# Patient Record
Sex: FEMALE | Race: ASIAN | NOT HISPANIC OR LATINO
[De-identification: names, ages, dates, MRNs, and addresses within clinical notes are randomized per-mention and may not be internally consistent; named-entity substitution may affect disease eponyms.]

---

## 2023-08-02 ENCOUNTER — APPOINTMENT (OUTPATIENT)
Dept: UROLOGY | Facility: CLINIC | Age: 58
End: 2023-08-02
Payer: COMMERCIAL

## 2023-08-02 VITALS
WEIGHT: 106 LBS | RESPIRATION RATE: 16 BRPM | HEIGHT: 64 IN | DIASTOLIC BLOOD PRESSURE: 75 MMHG | SYSTOLIC BLOOD PRESSURE: 114 MMHG | OXYGEN SATURATION: 99 % | BODY MASS INDEX: 18.1 KG/M2 | HEART RATE: 69 BPM

## 2023-08-02 DIAGNOSIS — N30.01 ACUTE CYSTITIS WITH HEMATURIA: ICD-10-CM

## 2023-08-02 PROBLEM — Z00.00 ENCOUNTER FOR PREVENTIVE HEALTH EXAMINATION: Status: ACTIVE | Noted: 2023-08-02

## 2023-08-02 PROCEDURE — 99204 OFFICE O/P NEW MOD 45 MIN: CPT | Mod: 25

## 2023-08-02 PROCEDURE — 51798 US URINE CAPACITY MEASURE: CPT

## 2023-08-02 NOTE — HISTORY OF PRESENT ILLNESS
[FreeTextEntry1] : Name AVI MEDINA  MRN [id number]   Oct  4 1965  Contact Number: [phone] ----------------------------------------------------------------------------------------------------------------------------------------- Date of First Visit: 23 Referring Provider/PCP: unsure - goes just yearly -----------------------------------------------------------------------------------------------------------------------------------------  CC: UTI  History of Present Illness: AVI MEDINA is a 57 year old female who presents for evaluation recent UTI.  About 2 years patient had burning with urination, suprapubic pain before and after urination. Urine culture was negative. Patient tested positive for herpes but no lesions. Saw urology and gave patient started pyridium. Symptoms did not really go away but subsided. No major issues over past 2 years. Then July this year patient had lower abdominal pain, burning with urination, feeling incomplete emptying and fever 101, pink urine. No flank pain. 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 Fever persisted x 4 days, then went to [facility] ED. Urine culture negative but was on macrobid at that time and was given cefuroxime x 10 days. No imaging. Fever went away. But still has residual burning before and after urination, feeling of incomplete emptying.  Patient drank beer last week and made symptoms worse.  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

## 2023-08-02 NOTE — ASSESSMENT
[FreeTextEntry1] : 57 year old with baseline mild urinary symptoms of dysuria, with recent episode of dysuria, hematuria, suprapubic pain, feeling incomplete emptying, and fever to 101. Was on antibiotics prior to culture, and culture negative. Fever and symptoms improved with cefuroxime but did not subside. Reports vaginal dryness at baseline.  We discussed episode likely febrile UTI despite negative culture as patient was on abx at the time. Symptoms could be persistent UTI versus residual inflammation. Given fevers, would like to work up with cystoscopy and CTU. Discussed role of vaginal estrogen with regards to UTIs and her baseline symptoms, will initiate. Also discussed role of water intake, cranberry pill, probiotics.  - UA, urine culture - CTU - cystoscopy - vaginal estrogen - increase water, cranberry, probiotics - f/u for cysto - warning signs for which to seek emergency attention discussed including fevers, chills, flank pain, N/V Anterior

## 2023-08-02 NOTE — PHYSICAL EXAM
[General Appearance - Well Developed] : well developed [General Appearance - Well Nourished] : well nourished [Normal Appearance] : normal appearance [Well Groomed] : well groomed [General Appearance - In No Acute Distress] : no acute distress [] : no respiratory distress [Respiration, Rhythm And Depth] : normal respiratory rhythm and effort [Exaggerated Use Of Accessory Muscles For Inspiration] : no accessory muscle use [Abdomen Soft] : soft [Abdomen Tenderness] : non-tender [Costovertebral Angle Tenderness] : no ~M costovertebral angle tenderness [Urethral Meatus] : the meatus of the urethra showed no abnormalities [FreeTextEntry1] : + tenderness urehtra, pelvic floor, pallor of vagina [Normal Station and Gait] : the gait and station were normal for the patient's age

## 2023-08-03 LAB
APPEARANCE: CLEAR
BACTERIA: NEGATIVE /HPF
BILIRUBIN URINE: NEGATIVE
BLOOD URINE: ABNORMAL
CAST: 0 /LPF
COLOR: YELLOW
EPITHELIAL CELLS: 3 /HPF
GLUCOSE QUALITATIVE U: NEGATIVE MG/DL
KETONES URINE: NEGATIVE MG/DL
LEUKOCYTE ESTERASE URINE: ABNORMAL
MICROSCOPIC-UA: NORMAL
NITRITE URINE: NEGATIVE
PH URINE: 5.5
PROTEIN URINE: NORMAL MG/DL
RED BLOOD CELLS URINE: 1 /HPF
SPECIFIC GRAVITY URINE: 1.02
UROBILINOGEN URINE: 0.2 MG/DL
WHITE BLOOD CELLS URINE: 84 /HPF

## 2023-08-04 LAB — BACTERIA UR CULT: NORMAL

## 2023-08-07 ENCOUNTER — NON-APPOINTMENT (OUTPATIENT)
Age: 58
End: 2023-08-07

## 2023-08-28 LAB — BACTERIA UR CULT: NORMAL

## 2023-08-29 LAB
APPEARANCE: ABNORMAL
BACTERIA: ABNORMAL /HPF
BILIRUBIN URINE: NEGATIVE
BLOOD URINE: ABNORMAL
CAST: 1 /LPF
COLOR: YELLOW
EPITHELIAL CELLS: 1 /HPF
GLUCOSE QUALITATIVE U: NEGATIVE MG/DL
KETONES URINE: NEGATIVE MG/DL
LEUKOCYTE ESTERASE URINE: ABNORMAL
MICROSCOPIC-UA: NORMAL
NITRITE URINE: NEGATIVE
PH URINE: 5.5
PROTEIN URINE: 300 MG/DL
RED BLOOD CELLS URINE: 2 /HPF
REVIEW: NORMAL
SPECIFIC GRAVITY URINE: 1.02
UROBILINOGEN URINE: 0.2 MG/DL
WHITE BLOOD CELLS URINE: 764 /HPF

## 2023-08-30 ENCOUNTER — APPOINTMENT (OUTPATIENT)
Dept: UROLOGY | Facility: CLINIC | Age: 58
End: 2023-08-30
Payer: COMMERCIAL

## 2023-08-30 VITALS
HEART RATE: 92 BPM | DIASTOLIC BLOOD PRESSURE: 63 MMHG | BODY MASS INDEX: 18.1 KG/M2 | SYSTOLIC BLOOD PRESSURE: 96 MMHG | OXYGEN SATURATION: 98 % | HEIGHT: 64 IN | RESPIRATION RATE: 16 BRPM | WEIGHT: 106 LBS

## 2023-08-30 PROCEDURE — 99214 OFFICE O/P EST MOD 30 MIN: CPT

## 2023-08-30 NOTE — ASSESSMENT
[FreeTextEntry1] : 57 year old with baseline mild urinary symptoms of dysuria, with recent episode of dysuria, hematuria, suprapubic pain, feeling incomplete emptying, and fever to 101. Was on antibiotics prior to culture, and culture negative. Fever and symptoms improved with cefuroxime but did not subside. Reports vaginal dryness at baseline.  CTU showed periurethral cyst. Recommend further eval with MRI and referral to eBty - will plan for cystoscopy with Bety to look for ostium given he will perform surgical repair if indicated.  Patient reports baseline dysuria, and is traveling to [place] tomorrow for 2-3 weeks. She would like to leave urine samplein case. She will not be reachable by phone, but if culture ultimately positive will email with results. Wrote for Bactrim but on to take if culture positive, given will not be able to prescribe when she is abroad.  Discussed role of vaginal estrogen with regards to UTIs and her baseline symptoms, will initiate. Also discussed role of water intake, cranberry pill, probiotics.  - UA, urine culture - pelvis MRI to better evaluate urethral cyst - referral to Bety for further management, case discussed with Bety - cystoscopy with Bety - vaginal estrogen - increase water, cranberry, probiotics - warning signs for which to seek emergency attention discussed including fevers, chills, flank pain, N/V. - traveling to [email or web address] tomorrow for 2-3 weeks - will fu upon return and do Bety KO f/u, cysto with Bety  - given patient traveling she would like me to send UA/culture to ensure no brewing infection with baseline symptoms (will email results [email or web address])

## 2023-08-30 NOTE — HISTORY OF PRESENT ILLNESS
[FreeTextEntry1] : Name AVI MEDINA MRN [id number]  Oct 4 1965 Contact Number: [phone] ----------------------------------------------------------------------------------------------------------------------------------------- Date of First Visit: 23 Referring Provider/PCP: unsure - goes just yearly -----------------------------------------------------------------------------------------------------------------------------------------  CC: UTI  History of Present Illness: AVI MEDINA is a 57 year old female who presents for evaluation recent UTI.  About 2 years patient had burning with urination, suprapubic pain before and after urination. Urine culture was negative. Patient tested positive for herpes but no lesions. Saw urology and gave patient started pyridium. Symptoms did not really go away but subsided. No major issues over past 2 years. Then July this year patient had lower abdominal pain, burning with urination, feeling incomplete emptying and fever 101, pink urine. No flank pain. 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 Fever persisted x 4 days, then went to [facility] ED. Urine culture negative but was on macrobid at that time and was given cefuroxime x 10 days. No imaging. Fever went away. But still has residual burning before and after urination, feeling of incomplete emptying.  Patient drank beer last week and made symptoms worse.  Bladder triggers: occasional coffee, English breakfast tea daily, +spicy food often, occasional tomatoes, no pineapple, occasional citrus/acidic food  + vaginal dryness, menopause 7 years ago ---------------------------------------------------------------------------------------------------------------------------------------- Interval History (2023):  Patient underwent CTU 23:  There is a 2.5 cm periurethral cyst with suspicion for adjacent urethral wall thickening. Reviewed imaging with radiology.  UA 23: moderate LE, 764 WBC, 2 RBC, moderate bacteria, urine culture negative  No more fevers or chills. Baseline pain/irritation. Symptoms much improved from acute episode, but not resolved. Patient will be traveling to [facility] tomorrow - wants to do cysto upon her return.  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 BILIARY: Unremarkable. PANCREAS: Unremarkable. SPLEEN: Unremarkable. ADRENAL GLANDS: Unremarkable. KIDNEYS: Unremarkable. URETERS: Unremarkable. URINARY BLADDER: Partially distended, limiting evaluation. There is mild diffuse urinary bladder wall thickening versus under distention. The inferior pelvis there is a 2.5 cm periurethral cyst with questionable urethral wall thickening, series #4, image #145 through 153, series #604, image #58. REPRODUCTIVE ORGANS: Retroverted uterus. STOMACH: Unremarkable. SMALL BOWEL: Unremarkable. LARGE BOWEL: Unremarkable. PERITONEUM: Unremarkable. LYMPH NODES: Unremarkable. VASCULATURE: Unremarkable. SOFT TISSUES: Unremarkable. BONES: There are discovertebral degenerative changes of the spine.  IMPRESSION:   There is a 2.5 cm periurethral cyst with suspicion for adjacent urethral wall thickening. Further evaluation with transvaginal and if still necessary translabial sonography is recommended. Mild diffuse urinary bladder wall thickening versus under distention. There is no evidence for nephrolithiasis or urinary tract obstruction. There are discovertebral degenerative changes of the spine.

## 2023-08-31 LAB
APPEARANCE: ABNORMAL
BACTERIA: ABNORMAL /HPF
BILIRUBIN URINE: NEGATIVE
BLOOD URINE: ABNORMAL
CAST: 3 /LPF
COLOR: YELLOW
EPITHELIAL CELLS: 8 /HPF
GLUCOSE QUALITATIVE U: NEGATIVE MG/DL
KETONES URINE: NEGATIVE MG/DL
LEUKOCYTE ESTERASE URINE: ABNORMAL
MICROSCOPIC-UA: NORMAL
NITRITE URINE: NEGATIVE
PH URINE: 5.5
PROTEIN URINE: 300 MG/DL
RED BLOOD CELLS URINE: 11 /HPF
REVIEW: NORMAL
SPECIFIC GRAVITY URINE: 1.02
UROBILINOGEN URINE: 0.2 MG/DL
WHITE BLOOD CELLS URINE: >998 /HPF

## 2023-09-01 ENCOUNTER — NON-APPOINTMENT (OUTPATIENT)
Age: 58
End: 2023-09-01

## 2023-09-01 LAB — BACTERIA UR CULT: NORMAL

## 2023-10-04 ENCOUNTER — APPOINTMENT (OUTPATIENT)
Dept: UROLOGY | Facility: CLINIC | Age: 58
End: 2023-10-04
Payer: COMMERCIAL

## 2023-10-04 VITALS
SYSTOLIC BLOOD PRESSURE: 96 MMHG | DIASTOLIC BLOOD PRESSURE: 70 MMHG | OXYGEN SATURATION: 99 % | HEART RATE: 82 BPM | TEMPERATURE: 97.9 F

## 2023-10-04 PROCEDURE — 99215 OFFICE O/P EST HI 40 MIN: CPT

## 2023-11-10 ENCOUNTER — APPOINTMENT (OUTPATIENT)
Dept: UROLOGY | Facility: CLINIC | Age: 58
End: 2023-11-10
Payer: COMMERCIAL

## 2023-11-10 VITALS
HEART RATE: 92 BPM | TEMPERATURE: 97.6 F | OXYGEN SATURATION: 100 % | DIASTOLIC BLOOD PRESSURE: 80 MMHG | SYSTOLIC BLOOD PRESSURE: 107 MMHG

## 2023-11-10 DIAGNOSIS — Z78.9 OTHER SPECIFIED HEALTH STATUS: ICD-10-CM

## 2023-11-10 DIAGNOSIS — N39.0 URINARY TRACT INFECTION, SITE NOT SPECIFIED: ICD-10-CM

## 2023-11-10 DIAGNOSIS — Z87.891 PERSONAL HISTORY OF NICOTINE DEPENDENCE: ICD-10-CM

## 2023-11-10 DIAGNOSIS — R31.9 URINARY TRACT INFECTION, SITE NOT SPECIFIED: ICD-10-CM

## 2023-11-10 PROCEDURE — 99214 OFFICE O/P EST MOD 30 MIN: CPT | Mod: 25

## 2023-11-10 PROCEDURE — 52000 CYSTOURETHROSCOPY: CPT

## 2023-11-10 PROCEDURE — 81003 URINALYSIS AUTO W/O SCOPE: CPT | Mod: QW

## 2023-11-17 LAB
BACTERIA UR CULT: NORMAL
BILIRUB UR QL STRIP: NORMAL
GLUCOSE UR-MCNC: NORMAL
HCG UR QL: 0.2 EU/DL
HGB UR QL STRIP.AUTO: NORMAL
KETONES UR-MCNC: NORMAL
LEUKOCYTE ESTERASE UR QL STRIP: NORMAL
NITRITE UR QL STRIP: NORMAL
PH UR STRIP: 5.5
PROT UR STRIP-MCNC: 30
SP GR UR STRIP: 1.03
URINE CYTOLOGY: NORMAL

## 2023-12-12 ENCOUNTER — APPOINTMENT (OUTPATIENT)
Dept: UROLOGY | Facility: CLINIC | Age: 58
End: 2023-12-12
Payer: COMMERCIAL

## 2023-12-12 VITALS — SYSTOLIC BLOOD PRESSURE: 119 MMHG | TEMPERATURE: 97.3 F | HEART RATE: 89 BPM | DIASTOLIC BLOOD PRESSURE: 73 MMHG

## 2023-12-12 PROCEDURE — 99214 OFFICE O/P EST MOD 30 MIN: CPT

## 2023-12-13 LAB
ANION GAP SERPL CALC-SCNC: 12 MMOL/L
APPEARANCE: ABNORMAL
APTT BLD: 29.4 SEC
BACTERIA: ABNORMAL /HPF
BILIRUBIN URINE: NEGATIVE
BLOOD URINE: NEGATIVE
BUN SERPL-MCNC: 23 MG/DL
CALCIUM SERPL-MCNC: 9.2 MG/DL
CAST: 1 /LPF
CHLORIDE SERPL-SCNC: 102 MMOL/L
CO2 SERPL-SCNC: 24 MMOL/L
COLOR: YELLOW
CREAT SERPL-MCNC: 0.92 MG/DL
EGFR: 73 ML/MIN/1.73M2
EPITHELIAL CELLS: 20 /HPF
GLUCOSE QUALITATIVE U: NEGATIVE MG/DL
GLUCOSE SERPL-MCNC: 96 MG/DL
INR PPP: 0.88 RATIO
KETONES URINE: NEGATIVE MG/DL
LEUKOCYTE ESTERASE URINE: ABNORMAL
MICROSCOPIC-UA: NORMAL
NITRITE URINE: NEGATIVE
PH URINE: 5.5
POTASSIUM SERPL-SCNC: 4.5 MMOL/L
PROTEIN URINE: NORMAL MG/DL
PT BLD: 10 SEC
RED BLOOD CELLS URINE: 0 /HPF
SODIUM SERPL-SCNC: 138 MMOL/L
SPECIFIC GRAVITY URINE: 1.03
UROBILINOGEN URINE: 0.2 MG/DL
WHITE BLOOD CELLS URINE: 6 /HPF

## 2023-12-14 ENCOUNTER — APPOINTMENT (OUTPATIENT)
Dept: HEART AND VASCULAR | Facility: CLINIC | Age: 58
End: 2023-12-14
Payer: COMMERCIAL

## 2023-12-14 ENCOUNTER — NON-APPOINTMENT (OUTPATIENT)
Age: 58
End: 2023-12-14

## 2023-12-14 VITALS
SYSTOLIC BLOOD PRESSURE: 130 MMHG | HEIGHT: 64 IN | BODY MASS INDEX: 18.1 KG/M2 | RESPIRATION RATE: 12 BRPM | DIASTOLIC BLOOD PRESSURE: 80 MMHG | HEART RATE: 78 BPM | WEIGHT: 106 LBS

## 2023-12-14 DIAGNOSIS — Z01.810 ENCOUNTER FOR PREPROCEDURAL CARDIOVASCULAR EXAMINATION: ICD-10-CM

## 2023-12-14 DIAGNOSIS — R01.1 CARDIAC MURMUR, UNSPECIFIED: ICD-10-CM

## 2023-12-14 LAB — BACTERIA UR CULT: NORMAL

## 2023-12-14 PROCEDURE — 93306 TTE W/DOPPLER COMPLETE: CPT

## 2023-12-14 PROCEDURE — 93000 ELECTROCARDIOGRAM COMPLETE: CPT | Mod: NC

## 2023-12-14 PROCEDURE — ZZZZZ: CPT

## 2023-12-14 PROCEDURE — 99204 OFFICE O/P NEW MOD 45 MIN: CPT | Mod: 25

## 2023-12-14 RX ORDER — SULFAMETHOXAZOLE AND TRIMETHOPRIM 800; 160 MG/1; MG/1
800-160 TABLET ORAL TWICE DAILY
Qty: 10 | Refills: 0 | Status: DISCONTINUED | COMMUNITY
Start: 2023-08-30 | End: 2023-12-14

## 2023-12-14 RX ORDER — ESTRADIOL 0.1 MG/G
0.1 CREAM VAGINAL
Qty: 1 | Refills: 2 | Status: DISCONTINUED | COMMUNITY
Start: 2023-08-02 | End: 2023-12-14

## 2023-12-14 RX ORDER — CONJUGATED ESTROGENS AND MEDROXYPROGESTERONE ACETATE .625; 2.5 MG/1; MG/1
0.625-2.5 TABLET, SUGAR COATED ORAL
Refills: 0 | Status: ACTIVE | COMMUNITY

## 2023-12-14 NOTE — DISCUSSION/SUMMARY
[Procedure Low Risk] : the procedure risk is low [Patient Low Risk] : the patient is a low surgical risk [Optimized for Surgery] : the patient is optimized for surgery [As per surgery] : as per surgery [FreeTextEntry1] : 1.  Preop clearance for elective cystoscopy and ureteroscopy: Patient is low risk for low risk procedure does greater than 4 METS of daily typically without chest pain shortness of breath PND orthopnea.  Reviewed preop blood work all within normal limits.  May proceed as planned. 2.  Cardiac murmur: Echocardiogram

## 2023-12-14 NOTE — PHYSICAL EXAM
[General Appearance - Well Developed] : well developed [Normal Appearance] : normal appearance [Well Groomed] : well groomed [General Appearance - Well Nourished] : well nourished [No Deformities] : no deformities [General Appearance - In No Acute Distress] : no acute distress [Normal Oral Mucosa] : normal oral mucosa [No Oral Pallor] : no oral pallor [No Oral Cyanosis] : no oral cyanosis [Normal Jugular Venous A Waves Present] : normal jugular venous A waves present [Normal Jugular Venous V Waves Present] : normal jugular venous V waves present [No Jugular Venous Rogers A Waves] : no jugular venous rogers A waves [Respiration, Rhythm And Depth] : normal respiratory rhythm and effort [Exaggerated Use Of Accessory Muscles For Inspiration] : no accessory muscle use [Auscultation Breath Sounds / Voice Sounds] : lungs were clear to auscultation bilaterally [5th Left ICS - MCL] : palpated at the 5th LICS in the midclavicular line [Normal] : normal [Normal Rate] : normal [Rhythm Regular] : regular [Normal S1] : normal S1 [Normal S2] : normal S2 [III] : a grade 3 [Right Carotid Bruit] : no bruit heard over the right carotid [Left Carotid Bruit] : no bruit heard over the left carotid [No Pitting Edema] : no pitting edema present [Bowel Sounds] : normal bowel sounds [Abdomen Soft] : soft [Abdomen Tenderness] : non-tender [Abdomen Mass (___ Cm)] : no abdominal mass palpated [Abnormal Walk] : normal gait [Gait - Sufficient For Exercise Testing] : the gait was sufficient for exercise testing [Nail Clubbing] : no clubbing of the fingernails [Cyanosis, Localized] : no localized cyanosis [Petechial Hemorrhages (___cm)] : no petechial hemorrhages [] : no ischemic changes [Oriented To Time, Place, And Person] : oriented to person, place, and time [Affect] : the affect was normal [Mood] : the mood was normal [No Anxiety] : not feeling anxious

## 2023-12-14 NOTE — HISTORY OF PRESENT ILLNESS
[Preoperative Visit] : for a medical evaluation prior to surgery [Scheduled Procedure ___] : a [unfilled] [Surgeon Name ___] : surgeon: [unfilled] [Good] : Good [Chest Pain] : no chest pain [Dyspnea] : no dyspnea [Lower Extremity Swelling] : no lower extremity swelling [FreeTextEntry1] : 58-year-old female with no significant past medical history comes in for preop clearance for elective cystoscopy/ureteroscopy for urethral cyst.  Overall does 4 METs of daily activity without chest pain shortness of breath PND orthopnea.

## 2023-12-15 RX ORDER — ACETAMINOPHEN 500 MG
1000 TABLET ORAL ONCE
Refills: 0 | Status: COMPLETED | OUTPATIENT
Start: 2023-12-18 | End: 2023-12-18

## 2023-12-15 NOTE — ASU PATIENT PROFILE, ADULT - NS PREOP UNDERSTANDS INFO
No solid food/dairy/candy/gum after 10:45pm Sunday; water allowed before 05:45am Monday; patient reminded to come with photo ID/insurance/credit card; dress comfortable; no jewelries/contact/lens/valuables; no smoking/drinking alcohol/recreational drug use Sunday; escort must have a photo ID; address and callback number given./yes

## 2023-12-15 NOTE — ASU PATIENT PROFILE, ADULT - FALL HARM RISK - UNIVERSAL INTERVENTIONS
Bed in lowest position, wheels locked, appropriate side rails in place/Call bell, personal items and telephone in reach/Instruct patient to call for assistance before getting out of bed or chair/Non-slip footwear when patient is out of bed/[place] to call system/Physically safe environment - no spills, clutter or unnecessary equipment/Purposeful Proactive Rounding/Room/bathroom lighting operational, light cord in reach Bed in lowest position, wheels locked, appropriate side rails in place/Call bell, personal items and telephone in reach/Instruct patient to call for assistance before getting out of bed or chair/Non-slip footwear when patient is out of bed/[place] to call system/Physically safe environment - no spills, clutter or unnecessary equipment/Purposeful Proactive Rounding/Room/bathroom lighting operational, light cord in reach

## 2023-12-17 ENCOUNTER — TRANSCRIPTION ENCOUNTER (OUTPATIENT)
Age: 58
End: 2023-12-17

## 2023-12-18 ENCOUNTER — OUTPATIENT (OUTPATIENT)
Dept: OUTPATIENT SERVICES | Facility: HOSPITAL | Age: 58
LOS: 1 days | Discharge: HOME | End: 2023-12-18
Payer: COMMERCIAL

## 2023-12-18 ENCOUNTER — TRANSCRIPTION ENCOUNTER (OUTPATIENT)
Age: 58
End: 2023-12-18

## 2023-12-18 ENCOUNTER — RESULT REVIEW (OUTPATIENT)
Age: 58
End: 2023-12-18

## 2023-12-18 ENCOUNTER — APPOINTMENT (OUTPATIENT)
Dept: UROLOGY | Facility: HOSPITAL | Age: 58
End: 2023-12-18

## 2023-12-18 VITALS
OXYGEN SATURATION: 100 % | WEIGHT: 105.82 LBS | RESPIRATION RATE: 16 BRPM | HEART RATE: 73 BPM | SYSTOLIC BLOOD PRESSURE: 124 MMHG | DIASTOLIC BLOOD PRESSURE: 85 MMHG | TEMPERATURE: 98 F | HEIGHT: 64 IN

## 2023-12-18 VITALS
RESPIRATION RATE: 16 BRPM | TEMPERATURE: 98 F | OXYGEN SATURATION: 98 % | SYSTOLIC BLOOD PRESSURE: 106 MMHG | DIASTOLIC BLOOD PRESSURE: 72 MMHG | HEART RATE: 66 BPM

## 2023-12-18 PROCEDURE — 52234 CYSTOSCOPY AND TREATMENT: CPT

## 2023-12-18 PROCEDURE — 88305 TISSUE EXAM BY PATHOLOGIST: CPT | Mod: 26

## 2023-12-18 RX ORDER — FENTANYL CITRATE 50 UG/ML
25 INJECTION INTRAVENOUS
Refills: 0 | Status: DISCONTINUED | OUTPATIENT
Start: 2023-12-18 | End: 2023-12-18

## 2023-12-18 RX ORDER — PHENAZOPYRIDINE HCL 100 MG
200 TABLET ORAL ONCE
Refills: 0 | Status: DISCONTINUED | OUTPATIENT
Start: 2023-12-18 | End: 2023-12-18

## 2023-12-18 RX ORDER — PHENAZOPYRIDINE HCL 100 MG
1 TABLET ORAL
Qty: 9 | Refills: 0
Start: 2023-12-18 | End: 2023-12-20

## 2023-12-18 RX ORDER — SODIUM CHLORIDE 9 MG/ML
1000 INJECTION, SOLUTION INTRAVENOUS
Refills: 0 | Status: DISCONTINUED | OUTPATIENT
Start: 2023-12-18 | End: 2023-12-18

## 2023-12-18 RX ADMIN — Medication 1000 MILLIGRAM(S): at 07:15

## 2023-12-18 RX ADMIN — SODIUM CHLORIDE 100 MILLILITER(S): 9 INJECTION, SOLUTION INTRAVENOUS at 09:59

## 2023-12-18 NOTE — ASU DISCHARGE PLAN (ADULT/PEDIATRIC) - PROVIDER TOKENS
PROVIDER:[TOKEN:[[id number]],FOLLOWUP:[2 weeks]] PROVIDER:[TOKEN:[[id number]],FOLLOWUP:[2 weeks]]

## 2023-12-18 NOTE — ASU DISCHARGE PLAN (ADULT/PEDIATRIC) - NS MD DC FALL RISK RISK
For information on Fall & Injury Prevention, visit: [email or web address] OR  [email or web address] OR  https://www.cdc.gov/steadi/patient.html For information on Fall & Injury Prevention, visit: [email or web address]://[email or web address] OR  [email or web address]-[email or web address]-injury OR  https://www.cdc.gov/steadi/patient.html

## 2023-12-18 NOTE — ASU DISCHARGE PLAN (ADULT/PEDIATRIC) - ASU DC SPECIAL INSTRUCTIONSFT
TRANSURETHRAL RESECTION OF BLADDER TUMOR    GENERAL: It is common to have blood in your urine after your procedure.  It may be pink or even red; and it is important to increase fluid intake to 2-3L of water per day to keep the urine as clear as possible. Please inform your doctor if you have a significant amount of clot in the urine or if you are unable to void at all.  The urine may clear and then become bloody again especially as you are more physically active. It is not uncommon to have some burning when you urinate, this will gradually improve. If a catheter is in place, it is not uncommon to have occasional leakage or urine or blood around the catheter. Please call your urologist if this is excessive and/or the urine is not draining through the catheter into the bag.    CATHETER: Some patients are sent home with a Ventura catheter, while others go home urinating on their own. A Ventura catheter continuously drains the urine from the bladder. If you still have a catheter, the nurses will review instructions and care before you go home. For men, you may have a prescription for lidocaine jelly to apply to the tip of your penis, as needed, for catheter related discomfort.     UROLOGIC MEDICATIONS:  The following medications may have been sent to your pharmacy for stent related discomfort: Pyridium (phenazopyridine) 200mg every 8 hours as needed for kidney/bladder discomfort for max 3 days (Pyridium will make your urine orange). For your convenience, all prescriptions are sent to to your preferred pharmacy.     PAIN: You may take Tylenol (acetaminophen) 650-975mg and/or Motrin (ibuprofen) 400-600mg, both available over the counter, for pain every 6 hours as needed. Do not exceed 4000mg of Tylenol (acetaminophen) daily. You may alternate these medications such that you take one or the other every 3 hours for around the clock pain coverage.    STOOL SOFTENERS: Do not allow yourself to become constipated as straining may cause bleeding. Take stool softeners or a laxative (ex. Miralax, Colace, Senokot, ExLax, etc), available over the counter, if needed.    ANTICOAGULATION: If you are taking any blood thinning medications, please discuss with your urologist prior to restarting these medications unless otherwise specified.    BATHING: You may shower or bathe. If going home with ventura, shower only until catheter is removed.    DIET: You may resume your regular diet and regular medication regimen.    ACTIVITY: No heavy lifting or strenuous exercise until you are evaluated at your post-operative appointment. Otherwise, you may return to your usual level of physical activity.    FOLLOW-UP: If you did not already schedule your post-operative appointment, please call your urologist to schedule and follow-up appointment.    CALL YOUR UROLOGIST IF: You have any bleeding that does not stop, inability to void >8 hours, fever over 100.4 F, chills, persistent nausea/vomiting, changes in your incision concerning for infection, or if your pain is not controlled on your discharge pain medications.

## 2023-12-18 NOTE — PRE-ANESTHESIA EVALUATION ADULT - NSANTHOSAYNRD_GEN_A_CORE
No. ROGERS screening performed.  STOP BANG Legend: 0-2 = LOW Risk; 3-4 = INTERMEDIATE Risk; 5-8 = HIGH Risk

## 2023-12-18 NOTE — ASU DISCHARGE PLAN (ADULT/PEDIATRIC) - CARE PROVIDER_API CALL
Silvia Zavala  Urology  [address] [place] [id number]  Phone: [phone]  Fax: [phone]  Follow Up Time: 2 weeks   Silvia Zavala  Urology  [address] [place] [id number]  Phone: [phone]  Fax: [phone]  Follow Up Time: 2 weeks

## 2023-12-20 LAB
SURGICAL PATHOLOGY STUDY: SIGNIFICANT CHANGE UP
SURGICAL PATHOLOGY STUDY: SIGNIFICANT CHANGE UP

## 2023-12-21 ENCOUNTER — NON-APPOINTMENT (OUTPATIENT)
Age: 58
End: 2023-12-21

## 2024-01-02 ENCOUNTER — APPOINTMENT (OUTPATIENT)
Dept: UROLOGY | Facility: CLINIC | Age: 59
End: 2024-01-02

## 2024-01-03 ENCOUNTER — APPOINTMENT (OUTPATIENT)
Dept: UROLOGY | Facility: CLINIC | Age: 59
End: 2024-01-03
Payer: COMMERCIAL

## 2024-01-03 VITALS — TEMPERATURE: 97.8 F | HEART RATE: 80 BPM | SYSTOLIC BLOOD PRESSURE: 111 MMHG | DIASTOLIC BLOOD PRESSURE: 75 MMHG

## 2024-01-03 DIAGNOSIS — N32.9 BLADDER DISORDER, UNSPECIFIED: ICD-10-CM

## 2024-01-03 DIAGNOSIS — R39.15 URGENCY OF URINATION: ICD-10-CM

## 2024-01-03 PROCEDURE — 99214 OFFICE O/P EST MOD 30 MIN: CPT

## 2024-01-04 ENCOUNTER — RX RENEWAL (OUTPATIENT)
Age: 59
End: 2024-01-04

## 2024-01-04 RX ORDER — OXYBUTYNIN CHLORIDE 10 MG/1
10 TABLET, EXTENDED RELEASE ORAL DAILY
Qty: 90 | Refills: 0 | Status: ACTIVE | COMMUNITY
Start: 2024-01-03 | End: 1900-01-01

## 2024-01-08 LAB — BACTERIA UR CULT: NORMAL

## 2024-01-08 NOTE — HISTORY OF PRESENT ILLNESS
[FreeTextEntry1] : 58-year-old woman with a history of dysuria, suprapubic pain before urination. She has had negative urine cultures in the past. She does have a history of herpes however no current lesions. She did have recent recurrence of symptoms and CT imaging which revealed a periurethral cyst. There was measured at 2.5 cm, along with adjacent urethral wall thickening. She is here today as a second opinion with regards to what to do with this periurethral cyst at its symptoms associated with her voiding. She denies any other irritative or obstructive voiding symptoms. She denies any prolapse sensation. She denies any bowel issues. She does have vaginal dryness secondary to being postmenopausal which she has been treated with vaginal estrogen cream. Postvoid residual in the office today 0 ml.  11/10/23  58-year-old woman with periurethral cyst noted on CT scan, irritative symptoms including dysuria and suprapubic pain. Differential includes a periurethral cyst versus a urethral diverticulum. I recommended a cystoscopy and an MRI of the pelvis in order to further evaluate this finding on CT scan.  Cysto - bladder neck lesion noted, no sotia noted  MRI imaging reviewed  Discussed case with Dr. Zavala  Recommend biopsy, will arrange with Dr. Zavala.  1/3/24  Reviewed path - benign  Reviewed imaging.   Reviewed surgical option.   Plan: Given not bothering her at this point, hold off on surgery for now.  Trial of anticholinergic for urgency, se reviewed.  Urine c/s.

## 2024-03-18 PROBLEM — Z78.9 OTHER SPECIFIED HEALTH STATUS: Chronic | Status: ACTIVE | Noted: 2023-12-15

## 2024-04-02 ENCOUNTER — APPOINTMENT (OUTPATIENT)
Dept: UROLOGY | Facility: CLINIC | Age: 59
End: 2024-04-02
Payer: COMMERCIAL

## 2024-04-02 VITALS
DIASTOLIC BLOOD PRESSURE: 87 MMHG | SYSTOLIC BLOOD PRESSURE: 123 MMHG | OXYGEN SATURATION: 100 % | TEMPERATURE: 96.26 F | HEART RATE: 89 BPM

## 2024-04-02 PROCEDURE — 99215 OFFICE O/P EST HI 40 MIN: CPT

## 2024-04-04 NOTE — HISTORY OF PRESENT ILLNESS
[FreeTextEntry1] : 58-year-old woman with a history of dysuria, suprapubic pain before urination. She has had negative urine cultures in the past. She does have a history of herpes however no current lesions. She did have recent recurrence of symptoms and CT imaging which revealed a periurethral cyst. There was measured at 2.5 cm, along with adjacent urethral wall thickening. She is here today as a second opinion with regards to what to do with this periurethral cyst at its symptoms associated with her voiding. She denies any other irritative or obstructive voiding symptoms. She denies any prolapse sensation. She denies any bowel issues. She does have vaginal dryness secondary to being postmenopausal which she has been treated with vaginal estrogen cream. Postvoid residual in the office today 0 ml.  11/10/23  58-year-old woman with periurethral cyst noted on CT scan, irritative symptoms including dysuria and suprapubic pain. Differential includes a periurethral cyst versus a urethral diverticulum. I recommended a cystoscopy and an MRI of the pelvis in order to further evaluate this finding on CT scan.  Cysto - bladder neck lesion noted, no sotia noted  MRI imaging reviewed  Discussed case with Dr. Zavala  Recommend biopsy, will arrange with Dr. Zavala.  1/3/24  Reviewed path - benign  Reviewed imaging.  Reviewed surgical option.  Plan: Given not bothering her at this point, hold off on surgery for now. Trial of anticholinergic for urgency, se reviewed. Urine c/s.  4/2/24  58-year-old woman with known urethral diverticulum currently being managed conservatively.  She is now complaining that she is having more frequency and urgency symptoms as well as dribbling.  She also is having pain with intercourse.  At this point she is becoming significantly more bothered with her urethral diverticulum and is desirous of definitive treatment.    On exam, the periurethral's is still quite palpable, unable to express any fluid with palpation, mildly sore to exam.  Plan: RB APCs of urethral diverticulectomy discussed with the patient at length.  All of her questions were answered.  Discussed that she possibility of catheter to be indwelling for a week to 10 days if there is urethral reconstruction that is necessary in order to aid with healing.  Patient would like to move forward with surgery.

## 2024-04-10 LAB — BACTERIA UR CULT: NORMAL

## 2024-04-19 NOTE — ASU PATIENT PROFILE, ADULT - FALL HARM RISK - UNIVERSAL INTERVENTIONS
Bed in lowest position, wheels locked, appropriate side rails in place/Call bell, personal items and telephone in reach/Instruct patient to call for assistance before getting out of bed or chair/Non-slip footwear when patient is out of bed/[place] to call system/Physically safe environment - no spills, clutter or unnecessary equipment/Purposeful Proactive Rounding/Room/bathroom lighting operational, light cord in reach

## 2024-04-19 NOTE — ASU PATIENT PROFILE, ADULT - NS PREOP UNDERSTANDS INFO
Spoke to patient  to be NPO/NO solid foods after  2200 pm on Sunday night,  allow to drink water  till  12Mn, dress comfortable,, leave all valuable  at home , Bring ID photo and insurance  cards,  address and telephone given to  patient/yes

## 2024-04-19 NOTE — ASU PATIENT PROFILE, ADULT - PATIENT'S PREFERRED PRONOUN
Her/She [Dyspnea on exertion] : not dyspnea during exertion [Palpitations] : palpitations [Dizziness] : dizziness [Negative] : Heme/Lymph

## 2024-04-19 NOTE — ASU PATIENT PROFILE, ADULT - NSICDXPASTSURGICALHX_GEN_ALL_CORE_FT
PAST SURGICAL HISTORY:  No significant past surgical history      PAST SURGICAL HISTORY:  History of biopsy of bladder

## 2024-04-21 ENCOUNTER — TRANSCRIPTION ENCOUNTER (OUTPATIENT)
Age: 59
End: 2024-04-21

## 2024-04-22 ENCOUNTER — OUTPATIENT (OUTPATIENT)
Dept: OUTPATIENT SERVICES | Facility: HOSPITAL | Age: 59
LOS: 1 days | Discharge: HOME | End: 2024-04-22
Payer: COMMERCIAL

## 2024-04-22 ENCOUNTER — RESULT REVIEW (OUTPATIENT)
Age: 59
End: 2024-04-22

## 2024-04-22 ENCOUNTER — APPOINTMENT (OUTPATIENT)
Dept: UROLOGY | Facility: AMBULATORY SURGERY CENTER | Age: 59
End: 2024-04-22

## 2024-04-22 ENCOUNTER — TRANSCRIPTION ENCOUNTER (OUTPATIENT)
Age: 59
End: 2024-04-22

## 2024-04-22 VITALS
TEMPERATURE: 97 F | OXYGEN SATURATION: 100 % | RESPIRATION RATE: 16 BRPM | DIASTOLIC BLOOD PRESSURE: 64 MMHG | SYSTOLIC BLOOD PRESSURE: 108 MMHG | HEART RATE: 60 BPM

## 2024-04-22 VITALS
HEIGHT: 64.96 IN | SYSTOLIC BLOOD PRESSURE: 124 MMHG | DIASTOLIC BLOOD PRESSURE: 90 MMHG | TEMPERATURE: 97 F | RESPIRATION RATE: 16 BRPM | WEIGHT: 105.16 LBS | OXYGEN SATURATION: 99 % | HEART RATE: 66 BPM

## 2024-04-22 DIAGNOSIS — Z98.890 OTHER SPECIFIED POSTPROCEDURAL STATES: Chronic | ICD-10-CM

## 2024-04-22 PROCEDURE — 88305 TISSUE EXAM BY PATHOLOGIST: CPT | Mod: 26

## 2024-04-22 PROCEDURE — 53430 RECONSTRUCTION OF URETHRA: CPT

## 2024-04-22 PROCEDURE — 53230 REMOVAL OF URETHRA LESION: CPT

## 2024-04-22 RX ORDER — CEPHALEXIN 500 MG
1 CAPSULE ORAL
Qty: 9 | Refills: 0
Start: 2024-04-22 | End: 2024-04-24

## 2024-04-22 RX ORDER — SODIUM CHLORIDE 9 MG/ML
1000 INJECTION, SOLUTION INTRAVENOUS
Refills: 0 | Status: DISCONTINUED | OUTPATIENT
Start: 2024-04-22 | End: 2024-04-22

## 2024-04-22 RX ORDER — ACETAMINOPHEN 500 MG
1000 TABLET ORAL ONCE
Refills: 0 | Status: COMPLETED | OUTPATIENT
Start: 2024-04-22 | End: 2024-04-22

## 2024-04-22 RX ORDER — OXYCODONE HYDROCHLORIDE 5 MG/1
5 TABLET ORAL ONCE
Refills: 0 | Status: DISCONTINUED | OUTPATIENT
Start: 2024-04-22 | End: 2024-04-22

## 2024-04-22 RX ORDER — ESTROGEN,CON/M-PROGEST ACET 0.625 (14)
1 TABLET ORAL
Refills: 0 | DISCHARGE

## 2024-04-22 RX ORDER — ACETAMINOPHEN WITH CODEINE 300MG-30MG
1 TABLET ORAL
Qty: 5 | Refills: 0
Start: 2024-04-22

## 2024-04-22 RX ORDER — BENZOCAINE AND MENTHOL 5; 1 G/100ML; G/100ML
1 LIQUID ORAL ONCE
Refills: 0 | Status: DISCONTINUED | OUTPATIENT
Start: 2024-04-22 | End: 2024-04-22

## 2024-04-22 RX ORDER — FENTANYL CITRATE 50 UG/ML
25 INJECTION INTRAVENOUS
Refills: 0 | Status: DISCONTINUED | OUTPATIENT
Start: 2024-04-22 | End: 2024-04-22

## 2024-04-22 RX ORDER — OXYBUTYNIN CHLORIDE 5 MG
2 TABLET ORAL
Qty: 20 | Refills: 0
Start: 2024-04-22

## 2024-04-22 RX ORDER — ONDANSETRON 8 MG/1
4 TABLET, FILM COATED ORAL ONCE
Refills: 0 | Status: DISCONTINUED | OUTPATIENT
Start: 2024-04-22 | End: 2024-04-22

## 2024-04-22 RX ORDER — HYDROMORPHONE HYDROCHLORIDE 2 MG/ML
0.5 INJECTION INTRAMUSCULAR; INTRAVENOUS; SUBCUTANEOUS
Refills: 0 | Status: DISCONTINUED | OUTPATIENT
Start: 2024-04-22 | End: 2024-04-22

## 2024-04-22 RX ORDER — APREPITANT 80 MG/1
40 CAPSULE ORAL ONCE
Refills: 0 | Status: COMPLETED | OUTPATIENT
Start: 2024-04-22 | End: 2024-04-22

## 2024-04-22 RX ADMIN — Medication 1000 MILLIGRAM(S): at 07:40

## 2024-04-22 RX ADMIN — SODIUM CHLORIDE 100 MILLILITER(S): 9 INJECTION, SOLUTION INTRAVENOUS at 11:55

## 2024-04-22 RX ADMIN — FENTANYL CITRATE 25 MICROGRAM(S): 50 INJECTION INTRAVENOUS at 12:13

## 2024-04-22 RX ADMIN — FENTANYL CITRATE 25 MICROGRAM(S): 50 INJECTION INTRAVENOUS at 11:58

## 2024-04-22 RX ADMIN — OXYCODONE HYDROCHLORIDE 5 MILLIGRAM(S): 5 TABLET ORAL at 13:38

## 2024-04-22 RX ADMIN — OXYCODONE HYDROCHLORIDE 5 MILLIGRAM(S): 5 TABLET ORAL at 13:08

## 2024-04-22 RX ADMIN — FENTANYL CITRATE 25 MICROGRAM(S): 50 INJECTION INTRAVENOUS at 12:15

## 2024-04-22 RX ADMIN — SODIUM CHLORIDE 100 MILLILITER(S): 9 INJECTION, SOLUTION INTRAVENOUS at 14:05

## 2024-04-22 RX ADMIN — APREPITANT 40 MILLIGRAM(S): 80 CAPSULE ORAL at 07:40

## 2024-04-22 NOTE — ASU DISCHARGE PLAN (ADULT/PEDIATRIC) - PAIN MANAGEMENT
Pre-Injection Information: Vital signs entered., Allergies reviewed as required for injection type., Pt states feeling well, no complaints. and Pt denies signs and symptoms of infection.    Refer to MAR (medication administration record) for type of injection and medication given.  Needle Size: 25 g. 5/8\"  Patient tolerated well: Stable     Dr. So is supervising clinician today.        Pain 0   (0-10 scale)    Fall risk 20  (Cabrera Fall Risk)    ECOG 2  (Performance Status)   0 - Fully active, able to carry on all predisease activites without restrictions.1 - No physically strenuous activity, but ambulatory and able to carry out light or sedentary work.2 - Ambulatory/capable of self-care, unable to perform work activities. Up & about more than 50% of the day.3 - Capable of only limited self-care, confined to bed or chair more than 50% of waking hours.4 - Completely disabled, totally confined to bed or chair. Cannot carry on any self-care.    Pysch/Social No issues identified    Abuse/Neglect - No abuse/neglect concerns identified  Dr. So is supervising physician today.   Prescriptions electronically submitted to pharmacy from [facility]

## 2024-04-22 NOTE — ASU DISCHARGE PLAN (ADULT/PEDIATRIC) - CARE PROVIDER_API CALL
Prashanth Albert  Urology  [address], [facility] [place] [address]  Phone: [phone]  Fax: [phone]  Follow Up Time:

## 2024-04-22 NOTE — ASU DISCHARGE PLAN (ADULT/PEDIATRIC) - ASU DC SPECIAL INSTRUCTIONSFT
TRANSVAGINAL SURGERY    SURGICAL WOUND: There are often lumps and bumps that can be felt in the vagina on either or both sides up to two (2) months or more post operatively. These are of no concern and with time they will soften and disappear.  Any “black and blue” bruising areas will also resolve. There may also be some vaginal bleeding during this time. A liner should be used for the next few weeks, unless bathing, to capture any bleeding. You may apply an ice-pack for 15 minutes out of every hour for the first 24 -36 hours to minimize pain and swelling.    STITCHES: The stitches in the incision will dissolve and fall out by themselves. Sometimes skin stitches may open, allowing a slight gaping of the incision. This is no problem if you keep the area clean.    CATHETER: Some patients are sent home with a Ladd catheter, while others go home urinating on their own. A Ladd catheter continuously drains the urine from the bladder. If you still have a catheter, the nurses will review instructions and care before you go home.    PAIN: You may have some intermittent pain for up to six (6) weeks post operatively. Pain does not signify any problem unless associated with fever, chills, or inability to void.  If you experience any fevers or chills please call immediately as this may be signs of an infection. You may take Tylenol (acetaminophen) 650-975mg and/or Motrin (ibuprofen) 400-600mg, both available over the counter, for pain every 6 hours as needed. Do not exceed 4000mg of Tylenol (acetaminophen) daily. You may alternate these medications such that you take one or the other every 3 hours for around the clock pain coverage. For severe pain, take Percocet 5/325mg every 6 hours.     ANTIBIOTICS: You may be given a prescription for an antibiotic, please take this medication as instructed and be sure to complete the entire course.     STOOL SOFTENERS: Do not allow yourself to become constipated as straining may cause bleeding. Take stool softeners or a laxative (ex. Miralax, Colace, Senokot, ExLax, etc), available over the counter, if taking Percocet.     ANTICOAGULATION: If you are taking any blood thinning medications, please discuss with your urologist prior to restarting these medications unless otherwise specified.    BATHING: You may sponge bath 24 hours after surgery, but minimize water to the surgical incision.    DIET: You may resume your regular diet and regular medication regimen.    ACTIVITY: No heavy lifting or strenuous exercise until you are evaluated at your post-operative appointment. Otherwise, you may return to your usual level of physical activity.    FOLLOW-UP: If you did not already schedule your post-operative appointment, please call your urologist to schedule and follow-up appointment.    CALL YOUR UROLOGIST IF: You have any bleeding that does not stop, inability to void >8 hours, fever over 100.4 F, chills, persistent nausea/vomiting, changes in your incision concerning for infection, or if your pain is not controlled on your discharge pain medications.

## 2024-04-22 NOTE — ASU DISCHARGE PLAN (ADULT/PEDIATRIC) - NS MD DC FALL RISK RISK
For information on Fall & Injury Prevention, visit: [email or web address] OR  [email or web address] OR  https://www.cdc.gov/steadi/patient.html

## 2024-04-22 NOTE — BRIEF OPERATIVE NOTE - OPERATION/FINDINGS
urethral diverticulum removed, no big urethral defects. Minimal leakage on test, one stitch given to the urethra.

## 2024-04-27 LAB
CULTURE RESULTS: ABNORMAL
SPECIMEN SOURCE: SIGNIFICANT CHANGE UP

## 2024-04-29 ENCOUNTER — APPOINTMENT (OUTPATIENT)
Dept: UROLOGY | Facility: CLINIC | Age: 59
End: 2024-04-29
Payer: COMMERCIAL

## 2024-04-29 VITALS
HEART RATE: 81 BPM | BODY MASS INDEX: 17.85 KG/M2 | SYSTOLIC BLOOD PRESSURE: 143 MMHG | WEIGHT: 104 LBS | OXYGEN SATURATION: 100 % | DIASTOLIC BLOOD PRESSURE: 94 MMHG

## 2024-04-29 LAB — SURGICAL PATHOLOGY STUDY: SIGNIFICANT CHANGE UP

## 2024-04-29 PROCEDURE — 99024 POSTOP FOLLOW-UP VISIT: CPT

## 2024-05-28 ENCOUNTER — APPOINTMENT (OUTPATIENT)
Dept: UROLOGY | Facility: CLINIC | Age: 59
End: 2024-05-28
Payer: COMMERCIAL

## 2024-05-28 VITALS
DIASTOLIC BLOOD PRESSURE: 76 MMHG | BODY MASS INDEX: 17.75 KG/M2 | SYSTOLIC BLOOD PRESSURE: 110 MMHG | OXYGEN SATURATION: 100 % | WEIGHT: 104 LBS | HEART RATE: 72 BPM | HEIGHT: 64 IN

## 2024-05-28 DIAGNOSIS — N36.8 OTHER SPECIFIED DISORDERS OF URETHRA: ICD-10-CM

## 2024-05-28 PROCEDURE — 99024 POSTOP FOLLOW-UP VISIT: CPT

## 2024-05-29 PROBLEM — N36.8 URETHRAL CYST: Status: ACTIVE | Noted: 2023-08-30

## 2024-05-29 NOTE — HISTORY OF PRESENT ILLNESS
[FreeTextEntry1] : Going well 1 months/p urethral diverticulectomy with urethral repair.   Pathology benign.   pe - sutures intact, neg cst  Plan: Pelvic rest 8 weeks total F/u 3 months

## 2024-08-27 ENCOUNTER — APPOINTMENT (OUTPATIENT)
Dept: UROLOGY | Facility: CLINIC | Age: 59
End: 2024-08-27
Payer: COMMERCIAL

## 2024-08-27 VITALS
WEIGHT: 105 LBS | BODY MASS INDEX: 17.93 KG/M2 | TEMPERATURE: 207.5 F | DIASTOLIC BLOOD PRESSURE: 80 MMHG | HEART RATE: 68 BPM | HEIGHT: 64 IN | SYSTOLIC BLOOD PRESSURE: 116 MMHG | OXYGEN SATURATION: 99 %

## 2024-08-27 DIAGNOSIS — N36.1 URETHRAL DIVERTICULUM: ICD-10-CM

## 2024-08-27 DIAGNOSIS — R39.15 URGENCY OF URINATION: ICD-10-CM

## 2024-08-27 DIAGNOSIS — N36.8 OTHER SPECIFIED DISORDERS OF URETHRA: ICD-10-CM

## 2024-08-27 DIAGNOSIS — Z87.440 PERSONAL HISTORY OF URINARY (TRACT) INFECTIONS: ICD-10-CM

## 2024-08-27 PROCEDURE — 99213 OFFICE O/P EST LOW 20 MIN: CPT

## 2024-08-31 NOTE — ASSESSMENT
[FreeTextEntry1] : 58F hx of symptomatic urethral diverticulum s/p urethral diverticulectomy on 4/22/24 presents for 3 month f/u. Pathology benign. Doing well post op with resolution of urinary symptoms. Has a hx of UTI and would like some culture cups available in case it occurs in the future.   Plan: F/u PRN  UA/Ucx orders as precaution

## 2024-08-31 NOTE — HISTORY OF PRESENT ILLNESS
[FreeTextEntry1] : 58F hx of symptomatic urethral diverticulum s/p urethral diverticulectomy on 4/22/24 presents for 3 month f/u.  Pathology benign. Doing great since surgery, reports resolution of dysuria and urinary symptoms. Denies infectious urinary symptoms or dyspareunia. Reports surgical site healing well.

## 2025-03-11 NOTE — ASU DISCHARGE PLAN (ADULT/PEDIATRIC) - CALL YOUR DOCTOR IF YOU HAVE ANY OF THE FOLLOWING:
Dr. Neal, you see the patient on 3/27 for you first appointment, the patient is requesting a medication refill as she will run out of her prescriptions before your scheduled appointment.  Is it okay to refill this patients medications?  All medications that would need to be refilled at this time are set up as pending.  Please advise.    Thank you   none Pain not relieved by Medications/Fever greater than (need to indicate Fahrenheit or Celsius)

## 2025-05-14 ENCOUNTER — APPOINTMENT (OUTPATIENT)
Dept: UROLOGY | Facility: CLINIC | Age: 60
End: 2025-05-14

## 2025-05-21 ENCOUNTER — NON-APPOINTMENT (OUTPATIENT)
Age: 60
End: 2025-05-21

## 2025-05-21 ENCOUNTER — APPOINTMENT (OUTPATIENT)
Dept: UROLOGY | Facility: CLINIC | Age: 60
End: 2025-05-21
Payer: COMMERCIAL

## 2025-05-21 VITALS
DIASTOLIC BLOOD PRESSURE: 72 MMHG | TEMPERATURE: 207.5 F | HEART RATE: 66 BPM | SYSTOLIC BLOOD PRESSURE: 118 MMHG | OXYGEN SATURATION: 99 %

## 2025-05-21 DIAGNOSIS — R30.0 DYSURIA: ICD-10-CM

## 2025-05-21 DIAGNOSIS — R39.15 URGENCY OF URINATION: ICD-10-CM

## 2025-05-21 PROCEDURE — 99214 OFFICE O/P EST MOD 30 MIN: CPT

## 2025-05-25 LAB — BACTERIA UR CULT: NORMAL

## 2025-07-09 ENCOUNTER — APPOINTMENT (OUTPATIENT)
Dept: UROLOGY | Facility: CLINIC | Age: 60
End: 2025-07-09
Payer: COMMERCIAL

## 2025-07-09 VITALS
TEMPERATURE: 206.78 F | HEART RATE: 91 BPM | SYSTOLIC BLOOD PRESSURE: 105 MMHG | DIASTOLIC BLOOD PRESSURE: 63 MMHG | OXYGEN SATURATION: 99 %

## 2025-07-09 PROCEDURE — 99214 OFFICE O/P EST MOD 30 MIN: CPT

## 2025-08-28 ENCOUNTER — OUTPATIENT (OUTPATIENT)
Dept: OUTPATIENT SERVICES | Facility: HOSPITAL | Age: 60
LOS: 1 days | Discharge: HOME | End: 2025-08-28
Payer: COMMERCIAL

## 2025-08-28 ENCOUNTER — APPOINTMENT (OUTPATIENT)
Dept: UROLOGY | Facility: AMBULATORY SURGERY CENTER | Age: 60
End: 2025-08-28

## 2025-08-28 ENCOUNTER — TRANSCRIPTION ENCOUNTER (OUTPATIENT)
Age: 60
End: 2025-08-28

## 2025-08-28 ENCOUNTER — RESULT REVIEW (OUTPATIENT)
Age: 60
End: 2025-08-28

## 2025-08-28 VITALS
DIASTOLIC BLOOD PRESSURE: 84 MMHG | HEART RATE: 69 BPM | HEIGHT: 65 IN | TEMPERATURE: 98 F | RESPIRATION RATE: 16 BRPM | OXYGEN SATURATION: 100 % | SYSTOLIC BLOOD PRESSURE: 122 MMHG | WEIGHT: 105.16 LBS

## 2025-08-28 VITALS
RESPIRATION RATE: 17 BRPM | SYSTOLIC BLOOD PRESSURE: 107 MMHG | HEART RATE: 79 BPM | OXYGEN SATURATION: 99 % | DIASTOLIC BLOOD PRESSURE: 69 MMHG

## 2025-08-28 DIAGNOSIS — Z98.890 OTHER SPECIFIED POSTPROCEDURAL STATES: Chronic | ICD-10-CM

## 2025-08-28 PROCEDURE — 53230 REMOVAL OF URETHRA LESION: CPT

## 2025-08-28 PROCEDURE — 88305 TISSUE EXAM BY PATHOLOGIST: CPT | Mod: 26

## 2025-08-28 PROCEDURE — 57311 REPAIR URETHROVAGINAL LESION: CPT

## 2025-08-28 PROCEDURE — 53430 RECONSTRUCTION OF URETHRA: CPT

## 2025-08-28 RX ORDER — PHENAZOPYRIDINE HCL 100 MG
200 TABLET ORAL EVERY 8 HOURS
Refills: 0 | Status: DISCONTINUED | OUTPATIENT
Start: 2025-08-28 | End: 2025-08-28

## 2025-08-28 RX ORDER — OXYBUTYNIN CHLORIDE 5 MG/1
1 TABLET, FILM COATED, EXTENDED RELEASE ORAL
Qty: 14 | Refills: 0
Start: 2025-08-28 | End: 2025-09-10

## 2025-08-28 RX ORDER — OXYCODONE HYDROCHLORIDE 30 MG/1
1 TABLET ORAL
Qty: 15 | Refills: 0
Start: 2025-08-28 | End: 2025-09-01

## 2025-08-28 RX ORDER — DOCUSATE SODIUM 100 MG
1 CAPSULE ORAL
Qty: 60 | Refills: 0
Start: 2025-08-28 | End: 2025-09-26

## 2025-08-28 RX ORDER — ACETAMINOPHEN 500 MG/5ML
1000 LIQUID (ML) ORAL ONCE
Refills: 0 | Status: COMPLETED | OUTPATIENT
Start: 2025-08-28 | End: 2025-08-28

## 2025-08-28 RX ORDER — APREPITANT 40 MG/1
40 CAPSULE ORAL ONCE
Refills: 0 | Status: COMPLETED | OUTPATIENT
Start: 2025-08-28 | End: 2025-08-28

## 2025-08-28 RX ORDER — SULFAMETHOXAZOLE/TRIMETHOPRIM 800-160 MG
1 TABLET ORAL
Qty: 14 | Refills: 0
Start: 2025-08-28 | End: 2025-09-10

## 2025-08-28 RX ORDER — SODIUM CHLORIDE 9 G/1000ML
1000 INJECTION, SOLUTION INTRAVENOUS
Refills: 0 | Status: DISCONTINUED | OUTPATIENT
Start: 2025-08-28 | End: 2025-08-28

## 2025-08-28 RX ORDER — OXYBUTYNIN CHLORIDE 5 MG/1
5 TABLET, FILM COATED, EXTENDED RELEASE ORAL ONCE
Refills: 0 | Status: DISCONTINUED | OUTPATIENT
Start: 2025-08-28 | End: 2025-08-28

## 2025-08-28 RX ORDER — FENTANYL CITRATE-0.9 % NACL/PF 100MCG/2ML
25 SYRINGE (ML) INTRAVENOUS
Refills: 0 | Status: DISCONTINUED | OUTPATIENT
Start: 2025-08-28 | End: 2025-08-28

## 2025-08-28 RX ADMIN — APREPITANT 40 MILLIGRAM(S): 40 CAPSULE ORAL at 07:34

## 2025-08-28 RX ADMIN — Medication 25 MICROGRAM(S): at 12:55

## 2025-08-28 RX ADMIN — SODIUM CHLORIDE 100 MILLILITER(S): 9 INJECTION, SOLUTION INTRAVENOUS at 12:00

## 2025-08-28 RX ADMIN — Medication 25 MICROGRAM(S): at 12:25

## 2025-08-28 RX ADMIN — Medication 1000 MILLIGRAM(S): at 07:34

## 2025-09-12 ENCOUNTER — APPOINTMENT (OUTPATIENT)
Dept: UROLOGY | Facility: CLINIC | Age: 60
End: 2025-09-12
Payer: COMMERCIAL

## 2025-09-12 VITALS
TEMPERATURE: 97.8 F | OXYGEN SATURATION: 99 % | HEART RATE: 76 BPM | DIASTOLIC BLOOD PRESSURE: 80 MMHG | SYSTOLIC BLOOD PRESSURE: 115 MMHG

## 2025-09-12 PROCEDURE — 99024 POSTOP FOLLOW-UP VISIT: CPT

## 2025-09-18 ENCOUNTER — APPOINTMENT (OUTPATIENT)
Dept: UROLOGY | Facility: CLINIC | Age: 60
End: 2025-09-18
Payer: COMMERCIAL

## 2025-09-18 DIAGNOSIS — N36.1 URETHRAL DIVERTICULUM: ICD-10-CM

## 2025-09-18 PROCEDURE — 99024 POSTOP FOLLOW-UP VISIT: CPT

## (undated) DEVICE — BLADE SURGICAL #15 CARBON

## (undated) DEVICE — POSITIONER FOAM EGG CRATE ULNAR 2PCS (PINK)

## (undated) DEVICE — TUBING TUR 2 PRONG

## (undated) DEVICE — DRSG KERLIX ROLL 4.5"

## (undated) DEVICE — PACK PERI GYN

## (undated) DEVICE — TUBING SUCTION NONCONDUCTIVE 6MM X 12FT

## (undated) DEVICE — VAGINAL PACKING 2"

## (undated) DEVICE — DRAINAGE BAG URINARY 2L

## (undated) DEVICE — SUT VICRYL PLUS 3-0 27" SH UNDYED

## (undated) DEVICE — GOWN ROYAL SILK XL

## (undated) DEVICE — ELCTR GROUNDING PAD ADULT COVIDIEN

## (undated) DEVICE — DRAPE IRRIGATION POUCH 19X23"

## (undated) DEVICE — ELCTR ECG CONDUCTIVE ADHESIVE

## (undated) DEVICE — FOLEY CATH 2-WAY 20F 5CC LATEX LUBRICATH

## (undated) DEVICE — DRSG DERMABOND 0.7ML

## (undated) DEVICE — SUT CHROMIC 3-0 27" SH

## (undated) DEVICE — SUT MONOCRYL 4-0 27" PS-2 UNDYED

## (undated) DEVICE — SOL IRR BAG NS 0.9% 3000ML

## (undated) DEVICE — DRSG CURITY GAUZE SPONGE 4 X 4" 12-PLY

## (undated) DEVICE — FOLEY HOLDER STATLOCK 2 WAY ADULT

## (undated) DEVICE — DRSG COMBINE 5X9"

## (undated) DEVICE — STAPLER SKIN PROXIMATE

## (undated) DEVICE — DRSG TELFA 3 X 8

## (undated) DEVICE — NDL HYPO SAFE 25G X 1.5" (ORANGE)

## (undated) DEVICE — VISITEC 4X4

## (undated) DEVICE — DRAIN PENROSE 5/8" X 18" LATEX

## (undated) DEVICE — MARKING PEN W RULER

## (undated) DEVICE — FRAZIER SUCTION TIP 8FR

## (undated) DEVICE — SUT CHROMIC 4-0 27" RB-1

## (undated) DEVICE — LONE STAR RETRACTOR RING 32.5CM X 18.3CM DISP

## (undated) DEVICE — ELCTR BOVIE PENCIL BLADE 10FT

## (undated) DEVICE — SUCTION YANKAUER BULBOUS TIP W VENT

## (undated) DEVICE — SUSPENSORY WITH LEG STRAPS LARGE

## (undated) DEVICE — Device

## (undated) DEVICE — SUT SILK 0 30" SH

## (undated) DEVICE — VENODYNE/SCD SLEEVE CALF MEDIUM

## (undated) DEVICE — LONE STAR ELASTIC STAY HOOK 5MM SHARP

## (undated) DEVICE — ELCTR PLASMA BAND 24FR 12-30 DEG

## (undated) DEVICE — SUT VICRYL 3-0 27" SH

## (undated) DEVICE — TUBING RANGER FLUID IRRIGATION SET DISP

## (undated) DEVICE — PREP BETADINE KIT

## (undated) DEVICE — PACK CYSTO

## (undated) DEVICE — SLV COMPRESSION KNEE MED

## (undated) DEVICE — DRAPE TOWEL BLUE 17" X 24"

## (undated) DEVICE — URETERAL CATH RED RUBBER 18FR (RED)

## (undated) DEVICE — GLV 8 PROTEXIS (WHITE)

## (undated) DEVICE — GLV 7.5 PROTEXIS (WHITE)

## (undated) DEVICE — WARMING BLANKET UPPER ADULT

## (undated) DEVICE — TUBING SET GRAVITY 2 SPIKE

## (undated) DEVICE — SUT VICRYL 2-0 27" CT-2 UNDYED

## (undated) DEVICE — BOSTON SCIENTIFIC UROLOK II SCOPE ADAPTOR

## (undated) DEVICE — UROVAC

## (undated) DEVICE — ELCTR PLASMA LOOP MEDIUM ANGLED 24FR 12-30 DEG

## (undated) DEVICE — PACK GENERAL MINOR

## (undated) DEVICE — SUT PDS II 5-0 27" RB-1

## (undated) DEVICE — ELCTR CUTTING LOOP MONOPOLAR ANGLED 24F

## (undated) DEVICE — ELCTR PLASMA BUTTON OVAL 24FR 12-30 DEG

## (undated) DEVICE — FOLEY CATH 2-WAY 16FR 5CC SILICONE ELASTOMER LATEX

## (undated) DEVICE — POLISHER OR CAUTERY TIP

## (undated) DEVICE — NDL HYPO SAFE 18G X 1.5" (PINK)

## (undated) DEVICE — SUT PDS II 6-0 27" TF

## (undated) DEVICE — DRSG STERISTRIPS 0.5 X 4"

## (undated) DEVICE — LONE STAR ELASTIC STAY HOOK 12MM BLUNT

## (undated) DEVICE — SYR ASEPTO

## (undated) DEVICE — FOLEY TRAY 14FR 5CC LF UMETER CLOSED

## (undated) DEVICE — DRAPE MAGNETIC INSTRUMENT MEDIUM

## (undated) DEVICE — LUBRICATING JELLY ONESHOT 1.25OZ